# Patient Record
Sex: MALE | URBAN - NONMETROPOLITAN AREA
[De-identification: names, ages, dates, MRNs, and addresses within clinical notes are randomized per-mention and may not be internally consistent; named-entity substitution may affect disease eponyms.]

---

## 2024-07-03 ENCOUNTER — APPOINTMENT (OUTPATIENT)
Dept: URBAN - NONMETROPOLITAN AREA SURGERY 8 | Age: 49
Setting detail: DERMATOLOGY
End: 2024-07-03

## 2024-07-03 DIAGNOSIS — L57.0 ACTINIC KERATOSIS: ICD-10-CM

## 2024-07-03 PROCEDURE — OTHER MIPS QUALITY: OTHER

## 2024-07-03 PROCEDURE — 99202 OFFICE O/P NEW SF 15 MIN: CPT

## 2024-07-03 PROCEDURE — OTHER ADDITIONAL NOTES: OTHER

## 2024-07-03 PROCEDURE — OTHER COUNSELING: OTHER

## 2024-07-03 ASSESSMENT — LOCATION ZONE DERM: LOCATION ZONE: SCALP

## 2024-07-03 ASSESSMENT — LOCATION SIMPLE DESCRIPTION DERM: LOCATION SIMPLE: ANTERIOR SCALP

## 2024-07-03 ASSESSMENT — LOCATION DETAILED DESCRIPTION DERM: LOCATION DETAILED: MID-FRONTAL SCALP

## 2024-07-03 NOTE — PROCEDURE: ADDITIONAL NOTES
Additional Notes: Was given option of PDT or Efudex treatment for the winter time. He will plan for Efudex/compounded 5FU with aloe when he is ready to do the treatment. Will see him 4 weeks after he completes treatment
Render Risk Assessment In Note?: no
Detail Level: Zone

## 2025-04-11 ENCOUNTER — APPOINTMENT (OUTPATIENT)
Dept: URBAN - NONMETROPOLITAN AREA SURGERY 8 | Age: 50
Setting detail: DERMATOLOGY
End: 2025-04-11

## 2025-04-11 DIAGNOSIS — L57.0 ACTINIC KERATOSIS: ICD-10-CM

## 2025-04-11 PROCEDURE — OTHER COUNSELING: OTHER

## 2025-04-11 PROCEDURE — 96573 PDT DSTR PRMLG LES PHYS/QHP: CPT

## 2025-04-11 PROCEDURE — OTHER MIPS QUALITY: OTHER

## 2025-04-11 PROCEDURE — OTHER PDT: BLUE: OTHER

## 2025-04-11 ASSESSMENT — LOCATION DETAILED DESCRIPTION DERM: LOCATION DETAILED: MID-FRONTAL SCALP

## 2025-04-11 ASSESSMENT — LOCATION ZONE DERM: LOCATION ZONE: SCALP

## 2025-04-11 ASSESSMENT — LOCATION SIMPLE DESCRIPTION DERM: LOCATION SIMPLE: ANTERIOR SCALP

## 2025-04-11 NOTE — PROCEDURE: PDT: BLUE
Incubation Time: 01:30:00
Show Medical Necessity In Plan?: Yes
Who Performed The Pdt?: Performed by MD LULY, PORFIRIO or NP ([id number])
Light Source: Douglas-U
Debridement Text (Will Only Render In Visit Note If You Select Debridement Option Under Who Performed The Pdt Field): Prior to application of the photodynamic medication the hyperkeratotic lesions were curetted to make them more amenable to therapy.
Who Performed The Pdt (Provider): Ayan Mckinley MD
Was Levulan/Ameluz Applied On A Previous Day?: No
Illumination Time: 00:16:40
Medical Necessity: Precancerous Lesions
Show Inventory Tab: Show
Treatment Number: 1
Post-Care Instructions: I reviewed with the patient in detail post-care instructions. Patient is to avoid sunlight for the next 2 days, and wear sun protection. Patients may expect sunburn like redness, discomfort and scabbing.
Total Number Of Aks Treated (Optional To Report): 0
Anesthesia Type: 1% lidocaine with epinephrine
Which Photosensitizer Was Used: Levulan
Consent: Written consent obtained.  The risks were reviewed with the patient including but not limited to: pigmentary changes, pain, blistering, scabbing, redness, and the remote possibility of scarring.
Detail Level: Zone
Pre-Procedure Text: The treatment areas were cleaned and prepped in the usual fashion.

## 2025-04-11 NOTE — HPI: BODY LOCATION - FACE
Additional History: Pt is here today to see if he should start viktoriya light therapy or efudex cream.